# Patient Record
Sex: MALE | Race: BLACK OR AFRICAN AMERICAN | Employment: UNEMPLOYED | URBAN - METROPOLITAN AREA
[De-identification: names, ages, dates, MRNs, and addresses within clinical notes are randomized per-mention and may not be internally consistent; named-entity substitution may affect disease eponyms.]

---
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
## 2019-09-24 PROBLEM — Z11.1 PPD SCREENING TEST: Status: RESOLVED | Noted: 2017-10-03 | Resolved: 2019-09-24
